# Patient Record
Sex: MALE | Race: WHITE | NOT HISPANIC OR LATINO | Employment: OTHER | URBAN - METROPOLITAN AREA
[De-identification: names, ages, dates, MRNs, and addresses within clinical notes are randomized per-mention and may not be internally consistent; named-entity substitution may affect disease eponyms.]

---

## 2019-10-03 ENCOUNTER — OFFICE VISIT (OUTPATIENT)
Dept: UROLOGY | Facility: CLINIC | Age: 69
End: 2019-10-03
Payer: MEDICARE

## 2019-10-03 VITALS — WEIGHT: 172.81 LBS | BODY MASS INDEX: 24.74 KG/M2 | HEIGHT: 70 IN

## 2019-10-03 DIAGNOSIS — N52.9 ERECTILE DYSFUNCTION, UNSPECIFIED ERECTILE DYSFUNCTION TYPE: ICD-10-CM

## 2019-10-03 DIAGNOSIS — N40.0 ENLARGED PROSTATE: Primary | ICD-10-CM

## 2019-10-03 LAB
BILIRUB SERPL-MCNC: ABNORMAL MG/DL
BLOOD URINE, POC: ABNORMAL
COLOR, POC UA: ABNORMAL
GLUCOSE UR QL STRIP: 1000
KETONES UR QL STRIP: ABNORMAL
LEUKOCYTE ESTERASE URINE, POC: ABNORMAL
NITRITE, POC UA: ABNORMAL
PH, POC UA: 6
PROTEIN, POC: ABNORMAL
SPECIFIC GRAVITY, POC UA: 1
UROBILINOGEN, POC UA: ABNORMAL

## 2019-10-03 PROCEDURE — 99204 PR OFFICE/OUTPT VISIT, NEW, LEVL IV, 45-59 MIN: ICD-10-PCS | Mod: 25,S$GLB,, | Performed by: UROLOGY

## 2019-10-03 PROCEDURE — 81002 POCT URINE DIPSTICK WITHOUT MICROSCOPE: ICD-10-PCS | Mod: S$GLB,,, | Performed by: UROLOGY

## 2019-10-03 PROCEDURE — 99204 OFFICE O/P NEW MOD 45 MIN: CPT | Mod: 25,S$GLB,, | Performed by: UROLOGY

## 2019-10-03 PROCEDURE — 81002 URINALYSIS NONAUTO W/O SCOPE: CPT | Mod: S$GLB,,, | Performed by: UROLOGY

## 2019-10-03 RX ORDER — AMLODIPINE BESYLATE AND ATORVASTATIN CALCIUM 2.5; 1 MG/1; MG/1
2.5 TABLET, FILM COATED ORAL
COMMUNITY

## 2019-10-03 RX ORDER — CLOPIDOGREL BISULFATE 75 MG/1
TABLET ORAL
Refills: 1 | COMMUNITY
Start: 2019-08-06

## 2019-10-03 RX ORDER — INSULIN DEGLUDEC 100 U/ML
30 INJECTION, SOLUTION SUBCUTANEOUS
COMMUNITY

## 2019-10-03 RX ORDER — LOSARTAN POTASSIUM 25 MG/1
25 TABLET ORAL
COMMUNITY

## 2019-10-03 RX ORDER — METOPROLOL SUCCINATE 50 MG/1
TABLET, EXTENDED RELEASE ORAL
Refills: 0 | COMMUNITY
Start: 2019-08-02

## 2019-10-03 RX ORDER — ATORVASTATIN CALCIUM 80 MG/1
TABLET, FILM COATED ORAL
Refills: 0 | COMMUNITY
Start: 2019-09-09

## 2019-10-03 RX ORDER — LANOLIN ALCOHOL/MO/W.PET/CERES
400 CREAM (GRAM) TOPICAL
COMMUNITY
Start: 2014-08-12

## 2019-10-03 RX ORDER — INSULIN GLARGINE 100 [IU]/ML
INJECTION, SOLUTION SUBCUTANEOUS
COMMUNITY
Start: 2015-01-22

## 2019-10-03 RX ORDER — AMLODIPINE BESYLATE 2.5 MG/1
TABLET ORAL
Refills: 5 | COMMUNITY
Start: 2019-09-29

## 2019-10-03 RX ORDER — UBIDECARENONE 75 MG
500 CAPSULE ORAL
COMMUNITY

## 2019-10-03 RX ORDER — METFORMIN HYDROCHLORIDE 500 MG/1
TABLET, EXTENDED RELEASE ORAL
Refills: 3 | COMMUNITY
Start: 2019-08-06

## 2019-10-03 RX ORDER — NITROGLYCERIN 0.4 MG/1
0.4 TABLET SUBLINGUAL
COMMUNITY
Start: 2014-08-14

## 2019-10-03 RX ORDER — LEVOTHYROXINE SODIUM 88 UG/1
TABLET ORAL
Refills: 5 | COMMUNITY
Start: 2019-08-06

## 2019-10-03 RX ORDER — GABAPENTIN 300 MG/1
CAPSULE ORAL
Refills: 5 | COMMUNITY
Start: 2019-08-06

## 2019-10-03 NOTE — PROGRESS NOTES
"Subjective:      Hiro Pate is a 69 y.o. male who was self-referred for evaluation of urologic follow-up.    Patient saw me many years ago at [facility].  These records are not available.  I do have his old PSAs which have always been 0.7 and 0.8.  The most recent PSA was in 2013    No urinary complaints.  He has erectile dysfunction which does not respond to Viagra.  Viagra is not on his medication list and he has nitroglycerin prescribed p.r.n.            The following portions of the patient's history were reviewed and updated as appropriate: allergies, current medications, past family history, past medical history, past social history, past surgical history and problem list.    Review of Systems  Constitutional: no fever or chills  ENT: no nasal congestion or sore throat  Respiratory: no cough or shortness of breath  Cardiovascular: no chest pain or palpitations  Gastrointestinal: no nausea or vomiting, tolerating diet  Genitourinary: as per HPI  Hematologic/Lymphatic: no easy bruising or lymphadenopathy  Musculoskeletal: no arthralgias or myalgias  Neurological: no seizures or tremors  Behavioral/Psych: no auditory or visual hallucinations     Objective:   Vitals: Ht 5' 10" (1.778 m)   Wt 78.4 kg (172 lb 13.5 oz)   BMI 24.80 kg/m²     Physical Exam   General: alert and oriented, no acute distress  Head: normocephalic, atraumatic  Neck: normal ROM  Respiratory: Symmetric expansion, non-labored breathing  Cardiovascular: no peripheral edema  Abdomen:  Nondistended  Genitourinary:   Prostate:  35 g no nodules; seminal vesicles not palpated  Rectum: normal rectal tone, no rectal mass, normal perineum  Skin: normal coloration and turgor, no rashes, no suspicious skin lesions noted  Neuro: alert and oriented x3, no gross deficits  Psych: normal judgment and insight, normal mood/affect and non-anxious    Physical Exam    Lab Review   Urinalysis demonstrates negative except glucose        No results found " for: WBC, HGB, HCT, MCV, PLT  No results found for: CREATININE, BUN  No results found for: PSA  Imaging  -  Assessment:     1. Enlarged prostate      Not symptomatic      2. ED  Plan:       The patient is on nitroglycerin p.r.n. therefore Viagra, Cialis and the other PD 5 inhibitors a contraindicated.  We discussed a vacuum erection device  We also discussed injections and penile prosthesis which he is not interested in    We discussed the risk and benefits of PSA screening.  Given his cardiac issues and normal prostate exam in multiple previous normal PSAs, we have chose to stop checking PSA    Return to clinic 1 year

## 2019-10-03 NOTE — PATIENT INSTRUCTIONS
You should NOT take viagra or cialis and any similar medications because you have nitroglycerin prescribed.  I left you a voicemail.  I am not sending Cialis to your pharmacy for this reason.          Using Vacuum Erection Therapy  The accompanying steps show how to gain and maintain an erection with a vacuum erection therapy system.      Getting started  · Place the rubber tension ring on the open end of the cylinder.  · Apply water-based lubricant to the end of the cylinder.  · Put your penis into the cylinder. Hold the cylinder firmly against your abdomen to create a seal, but take care not to pinch the scrotum.  Gaining an erection  · Squeeze the hand pump or turn on the electric pump. Blood will be drawn into your penis and your penis will become erect and firm.  · Follow the instructions youve been given for using your particular brand of pump.  · It may take some practice before you get optimum erections.  Using the tension ring  · When your penis is fully erect (usually less than 5 minutes), roll the tension ring off the cylinder onto the base of your penis. The tension ring holds blood in your penis, creating an erection. The area behind the ring remains soft and flexible.  · Remove the cylinder from your penis.  · After no longer than 30 minutes, remove the tension ring from your penis by grasping the tabs on the ring and pulling to stretch the ring.  When to call your healthcare provider  · A very cold penis during erection (some coolness is normal)  · A black-and-blue or significantly darkened penis (some discoloration is normal)  · Pain while using the vacuum device or tension ring  · Lack of an erection or loss of an erection before the tension ring is removed  Note: The tension ring may block ejaculation during orgasm. This is harmless, but will not prevent pregnancy.   Date Last Reviewed: 1/1/2017  © 3624-8362 [employer]. [address] [place] [address], [place] [address]. All rights reserved.  This information is not intended as a substitute for professional medical care. Always follow your healthcare professional's instructions.

## 2020-12-30 DIAGNOSIS — U07.1 COVID-19: Primary | ICD-10-CM

## 2020-12-31 ENCOUNTER — INFUSION (OUTPATIENT)
Dept: INFECTIOUS DISEASES | Facility: HOSPITAL | Age: 70
End: 2020-12-31
Attending: INTERNAL MEDICINE
Payer: MEDICARE

## 2020-12-31 VITALS
TEMPERATURE: 98 F | HEART RATE: 70 BPM | OXYGEN SATURATION: 99 % | WEIGHT: 170 LBS | DIASTOLIC BLOOD PRESSURE: 75 MMHG | HEIGHT: 70 IN | RESPIRATION RATE: 17 BRPM | BODY MASS INDEX: 24.34 KG/M2 | SYSTOLIC BLOOD PRESSURE: 156 MMHG

## 2020-12-31 DIAGNOSIS — U07.1 COVID-19: ICD-10-CM

## 2020-12-31 PROCEDURE — 63600175 PHARM REV CODE 636 W HCPCS: Performed by: INTERNAL MEDICINE

## 2020-12-31 PROCEDURE — M0239 BAMLANIVIMAB-XXXX INFUSION: HCPCS | Performed by: INTERNAL MEDICINE

## 2020-12-31 PROCEDURE — 25000003 PHARM REV CODE 250: Performed by: INTERNAL MEDICINE

## 2020-12-31 RX ORDER — EPINEPHRINE 0.1 MG/ML
0.3 INJECTION INTRAVENOUS
Status: ACTIVE | OUTPATIENT
Start: 2020-12-31

## 2020-12-31 RX ORDER — SODIUM CHLORIDE 0.9 % (FLUSH) 0.9 %
10 SYRINGE (ML) INJECTION
Status: ACTIVE | OUTPATIENT
Start: 2020-12-31

## 2020-12-31 RX ORDER — ONDANSETRON 4 MG/1
4 TABLET, ORALLY DISINTEGRATING ORAL ONCE AS NEEDED
Status: ACTIVE | OUTPATIENT
Start: 2020-12-31 | End: 2032-05-29

## 2020-12-31 RX ORDER — DIPHENHYDRAMINE HYDROCHLORIDE 50 MG/ML
25 INJECTION INTRAMUSCULAR; INTRAVENOUS ONCE AS NEEDED
Status: ACTIVE | OUTPATIENT
Start: 2020-12-31 | End: 2032-05-29

## 2020-12-31 RX ORDER — ACETAMINOPHEN 325 MG/1
650 TABLET ORAL ONCE AS NEEDED
Status: ACTIVE | OUTPATIENT
Start: 2020-12-31 | End: 2032-05-29

## 2020-12-31 RX ORDER — ALBUTEROL SULFATE 90 UG/1
2 AEROSOL, METERED RESPIRATORY (INHALATION)
Status: ACTIVE | OUTPATIENT
Start: 2020-12-31

## 2020-12-31 RX ADMIN — SODIUM CHLORIDE 700 MG: 0.9 INJECTION, SOLUTION INTRAVENOUS at 11:12

## 2020-12-31 NOTE — PROGRESS NOTES
Patient arrives for bamlanivimab infusion @ 1105 via POV and ambulated into Covid-19 EUA unit.    Symptoms - started on 12/28 of runny nose, and fatigue.     Patient received  infusion according to FDA recommendations and [facility] SOP without complications noted and left with mask in place and drug fact sheet provided.

## 2021-01-01 ENCOUNTER — TELEPHONE (OUTPATIENT)
Dept: ADMINISTRATIVE | Facility: CLINIC | Age: 71
End: 2021-01-01

## 2021-01-01 ENCOUNTER — NURSE TRIAGE (OUTPATIENT)
Dept: ADMINISTRATIVE | Facility: CLINIC | Age: 71
End: 2021-01-01